# Patient Record
Sex: MALE | Race: WHITE | NOT HISPANIC OR LATINO | Employment: FULL TIME | URBAN - METROPOLITAN AREA
[De-identification: names, ages, dates, MRNs, and addresses within clinical notes are randomized per-mention and may not be internally consistent; named-entity substitution may affect disease eponyms.]

---

## 2020-07-20 ENCOUNTER — HOSPITAL ENCOUNTER (EMERGENCY)
Facility: MEDICAL CENTER | Age: 25
End: 2020-07-20
Attending: EMERGENCY MEDICINE | Admitting: EMERGENCY MEDICINE
Payer: COMMERCIAL

## 2020-07-20 VITALS
DIASTOLIC BLOOD PRESSURE: 61 MMHG | TEMPERATURE: 98 F | HEART RATE: 97 BPM | OXYGEN SATURATION: 98 % | BODY MASS INDEX: 23.67 KG/M2 | HEIGHT: 71 IN | SYSTOLIC BLOOD PRESSURE: 126 MMHG | WEIGHT: 169.09 LBS | RESPIRATION RATE: 18 BRPM

## 2020-07-20 DIAGNOSIS — R06.4 HYPERVENTILATION: ICD-10-CM

## 2020-07-20 DIAGNOSIS — R11.0 NAUSEA: ICD-10-CM

## 2020-07-20 PROCEDURE — 99281 EMR DPT VST MAYX REQ PHY/QHP: CPT

## 2020-07-20 NOTE — ED NOTES
Pt ambulates back to room with brother  Pt reports tingling from head to toes x 2 hours and nausea  Denies vomiting

## 2020-07-20 NOTE — ED TRIAGE NOTES
Pt presents by self from home for low temp at home. Reports feeling clammy and nauseated. Also reporting tingling to arm, hands and legs. Hx of back problems that cause leg tingling. No bowel/bladder problems. No fever. A&Ox4 and ambulatory. Reports 2 energy drinks today.     Patient masked. No respiratory symptoms, no recent travel, denies known COVID exposure.

## 2020-07-21 NOTE — ED PROVIDER NOTES
"ED Provider Note    CHIEF COMPLAINT  Chief Complaint   Patient presents with   • Tingling   • Nausea       HPI  Des Devlin is a 25 y.o. male who presents after feeling nauseated and clammy at work.  He left work and while driving developed tingling in the hands and feet and a palpitation.  Those symptoms have now resolved.  Denies anxiety.  No fever cough shortness of breath.  No known coronavirus exposure.  Patient works in a hot building assembling playground equipment.  He is uncertain if he could have had heat exhaustion.    REVIEW OF SYSTEMS  Pertinent positives include: Nausea, clamminess, tingling, palpitation.  Pertinent negatives include: Chest pain, abdominal pain.    PAST MEDICAL HISTORY  Past Medical History:   Diagnosis Date   • Dental disorder     shifted teeth; several loose teeth   • Pain     jaw pain; bilateral knees       SOCIAL HISTORY  Manual labor worker.    CURRENT MEDICATIONS  None.    ALLERGIES  Allergies   Allergen Reactions   • Penicillin G Potassium Hives       PHYSICAL EXAM  VITAL SIGNS: /61   Pulse 97   Temp 36.7 °C (98 °F) (Temporal)   Resp 18   Ht 1.803 m (5' 11\")   Wt 76.7 kg (169 lb 1.5 oz)   SpO2 98%   BMI 23.58 kg/m²   Constitutional :  Well developed, Well nourished, normal blood pressure, afebrile, no hyperthermia.   HNT: Traumatic face symmetric.   Eyes: pupils reactive without eye discharge nor conjunctival hyperemia.  Cardiovascular: Regular rhythm, No murmurs, No rubs, No gallops.  No cyanosis.   Respiratory: No rales, rhonchi, wheeze or hyperventilation  Abdomen:  Soft, nontender  Skin: Warm, dry, no erythema, no rash.  No warmth to the skin  Musculoskeletal: no limb deformities.      COURSE & MEDICAL DECISION MAKING  Well-appearing patient presents with nausea and clamminess of unclear etiology.  He may have had heat exhaustion temporarily although that has resolved if that was the case.  He may have an early viral syndrome.  There is no evidence of " heatstroke.  Secondarily I think he hyperventilated and had tingling.  At this point no further work-up necessary.    This patient has borderline or elevated blood pressure as recorded above and was instructed to followup with primary physician for comprehensive blood pressure evaluation and yearly fasting cholesterol assessment according to to CMS protocol.    PLAN:  Reassurance  Rest and fluids  Heat exhaustion and hyperventilation handout given    Mike Parrish M.D.  [address] [place] [address]  [phone]    Schedule an appointment as soon as possible for a visit   As needed      CONDITION:  Good.    FINAL IMPRESSION:  1. Nausea    2. Hyperventilation          Electronically signed by: Sabino Lange M.D., 7/20/2020

## 2020-07-21 NOTE — DISCHARGE INSTRUCTIONS
You may have had heat exhaustion today or an early viral syndrome.  On your way home I think you are hyperventilating causing the tingling.  Your exam appears reassuringly normal.  You are not febrile.  Rest and drink 2 quarts of fluids this evening.  If you develop fever cough or shortness of breath you should be tested for COVID but that is unlikely at this time.  Return to the ER for uncontrolled vomiting ill appearance or other more concerning symptoms.